# Patient Record
Sex: FEMALE | Race: WHITE | Employment: OTHER | URBAN - METROPOLITAN AREA
[De-identification: names, ages, dates, MRNs, and addresses within clinical notes are randomized per-mention and may not be internally consistent; named-entity substitution may affect disease eponyms.]

---

## 2019-01-01 ENCOUNTER — APPOINTMENT (OUTPATIENT)
Dept: GENERAL RADIOLOGY | Age: 84
End: 2019-01-01
Payer: MEDICARE

## 2019-01-01 ENCOUNTER — HOSPITAL ENCOUNTER (EMERGENCY)
Age: 84
End: 2019-04-27
Attending: EMERGENCY MEDICINE
Payer: MEDICARE

## 2019-01-01 VITALS
HEIGHT: 63 IN | BODY MASS INDEX: 28.49 KG/M2 | WEIGHT: 160.8 LBS | HEART RATE: 66 BPM | TEMPERATURE: 98 F | SYSTOLIC BLOOD PRESSURE: 77 MMHG | RESPIRATION RATE: 22 BRPM | DIASTOLIC BLOOD PRESSURE: 40 MMHG

## 2019-01-01 DIAGNOSIS — J81.0 FLASH PULMONARY EDEMA (HCC): ICD-10-CM

## 2019-01-01 DIAGNOSIS — I21.3 ST ELEVATION MYOCARDIAL INFARCTION (STEMI), UNSPECIFIED ARTERY (HCC): ICD-10-CM

## 2019-01-01 DIAGNOSIS — R09.2 RESPIRATORY ARREST (HCC): Primary | ICD-10-CM

## 2019-01-01 DIAGNOSIS — I46.9 CARDIAC ARREST (HCC): ICD-10-CM

## 2019-01-01 LAB
EKG ATRIAL RATE: 375 BPM
EKG Q-T INTERVAL: 366 MS
EKG QRS DURATION: 122 MS
EKG QTC CALCULATION (BAZETT): 417 MS
EKG R AXIS: 24 DEGREES
EKG T AXIS: 64 DEGREES
EKG VENTRICULAR RATE: 78 BPM

## 2019-01-01 PROCEDURE — 2500000003 HC RX 250 WO HCPCS

## 2019-01-01 PROCEDURE — 92950 HEART/LUNG RESUSCITATION CPR: CPT

## 2019-01-01 PROCEDURE — 93005 ELECTROCARDIOGRAM TRACING: CPT | Performed by: EMERGENCY MEDICINE

## 2019-01-01 PROCEDURE — 6360000002 HC RX W HCPCS: Performed by: EMERGENCY MEDICINE

## 2019-01-01 PROCEDURE — 2580000003 HC RX 258: Performed by: EMERGENCY MEDICINE

## 2019-01-01 PROCEDURE — 2500000003 HC RX 250 WO HCPCS: Performed by: EMERGENCY MEDICINE

## 2019-01-01 PROCEDURE — 71045 X-RAY EXAM CHEST 1 VIEW: CPT

## 2019-01-01 PROCEDURE — 82962 GLUCOSE BLOOD TEST: CPT

## 2019-01-01 PROCEDURE — 99285 EMERGENCY DEPT VISIT HI MDM: CPT

## 2019-01-01 PROCEDURE — 36556 INSERT NON-TUNNEL CV CATH: CPT

## 2019-01-01 PROCEDURE — 6360000002 HC RX W HCPCS

## 2019-01-01 RX ORDER — COVID-19 ANTIGEN TEST
1 KIT MISCELLANEOUS PRN
COMMUNITY

## 2019-01-01 RX ORDER — ETOMIDATE 2 MG/ML
INJECTION INTRAVENOUS
Status: DISCONTINUED
Start: 2019-01-01 | End: 2019-01-01 | Stop reason: HOSPADM

## 2019-01-01 RX ORDER — SUCCINYLCHOLINE CHLORIDE 20 MG/ML
INJECTION INTRAMUSCULAR; INTRAVENOUS
Status: DISCONTINUED
Start: 2019-01-01 | End: 2019-01-01 | Stop reason: WASHOUT

## 2019-01-01 RX ADMIN — EPINEPHRINE 1 MG: 0.1 INJECTION, SOLUTION ENDOTRACHEAL; INTRACARDIAC; INTRAVENOUS at 02:21

## 2019-01-01 RX ADMIN — EPINEPHRINE 1 MG: 0.1 INJECTION, SOLUTION ENDOTRACHEAL; INTRACARDIAC; INTRAVENOUS at 02:39

## 2019-01-01 RX ADMIN — EPINEPHRINE 1 MG: 0.1 INJECTION, SOLUTION ENDOTRACHEAL; INTRACARDIAC; INTRAVENOUS at 02:28

## 2019-01-01 RX ADMIN — NOREPINEPHRINE BITARTRATE 5 MCG/MIN: 1 INJECTION INTRAVENOUS at 02:45

## 2019-01-01 RX ADMIN — EPINEPHRINE 1 MG: 0.1 INJECTION, SOLUTION ENDOTRACHEAL; INTRACARDIAC; INTRAVENOUS at 02:25

## 2019-04-27 NOTE — PROGRESS NOTES
1718 Dr. Quita Pack (on call for Dr. Shaye Gonzalez) called and spoke with Dr. Orly Herrera at this time. Dr. Quita Pack said that Dr. Shaye Gonzalez will sign the Death Certificate just send it to  Dr. Craig Mention office.

## 2019-04-27 NOTE — ED NOTES
[facility] pursuing donation, contacting daughter later this am, they will give further info to clinical manager, MANFRED Singer RN notified.      Mazin Garcia RN  99/54/40 [phone]

## 2019-04-27 NOTE — ED NOTES
Referred to [facility], they are placing a hold for tissue donation, pending speaking with family     Iqra Edwards RN  01/31/33 [id number]

## 2019-04-27 NOTE — ED NOTES
Jil Zahra  here to  body. [facility] called by RN and reports that the pt has been released. All paperwork signed and retained.       Adilson Oconnell RN  [phone]

## 2019-04-27 NOTE — ED NOTES
Pt's daughter, Magalie Finley, at bedside. Offered to call spiritual care in for family, daughter declined.       Delmy Cuello RN  04/27/19 2699

## 2019-04-27 NOTE — ED TRIAGE NOTES
Pt arrived in ED via EMS in respiratory arrest, EMS was bagging pt upon arrival into ED.  No family at bedside at the time of arrival. Daughter at bedside at this time

## 2019-04-29 LAB — METER GLUCOSE: 150 MG/DL (ref 74–99)

## 2023-01-30 NOTE — ED PROVIDER NOTES
27-year-old female presents in respiratory arrest. EMS state that she was short of breath a couple of hours prior to arrival. She lives at home by herself but called her daughter who lives next door to her and then EMS was called to pick her up. She was initially alert and oriented when EMS arrived but is unresponsive, apneic and pulseless when she rolls into our resuscitation bay. Review of Systems   Unable to perform ROS: Acuity of condition       Physical Exam   Constitutional: She appears well-developed and well-nourished. HENT:   Head: Normocephalic and atraumatic. Eyes: Conjunctivae are normal.   Fixed, nonreactive   Neck: Neck supple. Cardiovascular:   No palpable pulse   Pulmonary/Chest:   Coarse bilaterally, patient not taking breaths   Abdominal: Soft. Musculoskeletal:   Patient does not move extremities   Neurological:   Patient unresponsive   Skin: Skin is dry. Capillary refill takes less than 2 seconds. cool   Nursing note and vitals reviewed. Central Line  Date/Time: 4/27/2019 3:29 AM  Performed by: Denzel Sethi DO  Authorized by: Leticia Lee MD     Consent:     Consent obtained:  Emergent situation  Pre-procedure details:     Hand hygiene: Hand hygiene performed prior to insertion      Sterile barrier technique: All elements of maximal sterile technique followed      Skin preparation:  2% chlorhexidine    Skin preparation agent: Skin preparation agent completely dried prior to procedure    Procedure details:     Location:  R femoral    Patient position:  Flat    Procedural supplies:  Triple lumen    Landmarks identified: yes      Number of attempts:  1    Successful placement: yes    Post-procedure details:     Post-procedure:  Dressing applied    Assessment:  Blood return through all ports and free fluid flow    Patient tolerance of procedure:   Tolerated well, no immediate complications  Intubation  Date/Time: 4/27/2019 3:29 AM  Performed by: Denzel Sethi DO  Authorized by: Prince Hunt MD     Consent:     Consent obtained:  Emergent situation  Pre-procedure details:     Patient status:  Unresponsive  Procedure details:     Preoxygenation:  Bag valve mask    CPR in progress: yes      Intubation method:  Oral    Oral intubation technique:  Direct    Laryngoscope blade: Mac 4    Tube size (mm):  7.5    Tube type:  Cuffed    Number of attempts:  2    Ventilation between attempts: yes      Cricoid pressure: yes      Tube visualized through cords: yes    Placement assessment:     Tube secured with: Adhesive tape    Breath sounds:  Equal    Placement verification: chest rise and CXR verification      CXR findings:  ETT in proper place  Post-procedure details:     Patient tolerance of procedure: Tolerated well, no immediate complications        MDM  Number of Diagnoses or Management Options  Diagnosis management comments: This is a 80year old female with no past medical history presents in respiratory arrest.    By the time she arrives to us, she has no pulses and CPR is initiated. Patient is intubated and a central line is placed. Epinephrine given, chest compressions in process according to ACLS protocol. Glucose is within normal limits. Levophed drip is started. EKG shows lateral STEMI with diffuse ST depressions. There is no previous EKG for comparison. Patient's daughter later arrives, states that she just had an end-of-life discussion with her and she did not want to be intubated or have any chest compressions. The daughter works for hospice and she understands that if we take her off the drip on the ventilator that she will die. I offered to call cardiology as there is evidence of STEMI on EKG but the daughter does not want us to do this. She wants all resuscitative measures stopped. Discussed with Dr. Garima Baptiste who is on-call for the patient's PCP, Dr. Lacy Padilla who agrees that he will sign the death certificate.        Amount and/or Complexity of Data Reviewed  Clinical lab tests: ordered and reviewed  Tests in the radiology section of CPT®: ordered and reviewed        ED Course as of Apr 27 0334   Sat Apr 27, 2019   0311 Daughter who is the patient's POA, wishes that all resuscitative efforts be terminated. Explained that if we stopped her levo drip that she will die. She states that she just had an end-of-life conversation with her mother a week ago and she did not want to be placed on a ventilator and did not want CPR. She states she has a copy of this at home but she didn't think to bring it with her. Explained that as she is the patient's next of kin, the decision is ultimately hers and she voices understanding. She again voiced that she would like our efforts to be stopped. I offered to call cardiology as it appears that she is having ST elevation MI on EKG and transfer her downtown but she does not want this. Time of death 3:07 AM.    [CW]   0772 Dr. Tone Farooq on call for Dr. Ximena Benítez agrees Dr. Ximena Benítez will sign death certificate, will send to him Monday. [CW]      ED Course User Index  [CW] Rah Dhaliwal, DO       ----------------------------------------------- PAST HISTORY --------------------------------------------  Past Medical History:  has a past medical history of Arthritis. Past Surgical History:  has a past surgical history that includes eye surgery (Bilateral) and Carpal tunnel release (Right). Social History:  reports that she has never smoked. She has never used smokeless tobacco. She reports that she drank alcohol. She reports that she does not use drugs. Family History: family history is not on file. The patients home medications have been reviewed. Allergies: Patient has no known allergies. ------------------------------------------------ RESULTS ---------------------------------------------------    LABS:  No results found for this visit on 04/27/19.     RADIOLOGY:    All Radiology results interpreted by Radiologist unless otherwise noted. XR CHEST PORTABLE    (Results Pending)       EKG: This EKG is signed and interpreted by ED Physician. Time: 248  Rate: 78  Rhythm: RBBB LOTTIE lateral leads  Interpretation: depressions in inferior and septal leads  Comparison: no previous ekg    ---------------------------- NURSING NOTES AND VITALS REVIEWED -------------------------   The nursing notes within the ED encounter and vital signs as below have been reviewed. Pulse 66   Temp 98 °F (36.7 °C) (Axillary)   Resp 22   Ht 5' 3\" (1.6 m)   Wt 160 lb 12.8 oz (72.9 kg)   BMI 28.48 kg/m²   Oxygen Saturation Interpretation: Abnormal      ------------------------------------------PROGRESS NOTES -------------------------------------------    ED COURSE MEDICATIONS:                Medications   norepinephrine (LEVOPHED) 16 mg in dextrose 5 % 250 mL infusion (has no administration in time range)           --------------------------------------- IMPRESSION & DISPOSITION --------------------------------     IMPRESSION(s):  1. Respiratory arrest ([address]    2. Cardiac arrest ([address]    3. Flash pulmonary edema ([address]    4. ST elevation myocardial infarction (STEMI), unspecified artery ([address]        This patient's ED course included: a personal history and physicial examination    This patient has deteriorated during their ED course. DISPOSITION:  .        Meg Cintron,   Resident  [phone] Discharged